# Patient Record
Sex: FEMALE | URBAN - METROPOLITAN AREA
[De-identification: names, ages, dates, MRNs, and addresses within clinical notes are randomized per-mention and may not be internally consistent; named-entity substitution may affect disease eponyms.]

---

## 2017-02-23 ENCOUNTER — APPOINTMENT (RX ONLY)
Dept: URBAN - METROPOLITAN AREA CLINIC 5 | Facility: CLINIC | Age: 42
Setting detail: DERMATOLOGY
End: 2017-02-23

## 2017-02-23 DIAGNOSIS — H61.11 ACQUIRED DEFORMITY OF PINNA: ICD-10-CM

## 2017-02-23 PROBLEM — H61.119 ACQUIRED DEFORMITY OF PINNA, UNSPECIFIED EAR: Status: ACTIVE | Noted: 2017-02-23

## 2017-02-23 PROCEDURE — ? RECOMMENDATIONS

## 2017-02-23 PROCEDURE — ? CONSULTATION - STRETCHED EARLOBE

## 2017-02-23 NOTE — PROCEDURE: RECOMMENDATIONS
Detail Level: Zone
Recommendation Preamble: The following recommendations were made during the visit:
Recommendations (Free Text): 1. Earlobe repair. Bilateral. Incomplete tear\\n\\n-may fernando in future after 6weeks with small studs. Large heavy earrings are not advised and will likely be painful until patient is months out of procedure.\\n-quoted $700 for procedure

## 2017-02-23 NOTE — HPI: EAR (TORN EARLOBE)
Is This A New Presentation, Or A Follow-Up?: Piercehole Tear
Which Ear(S) Are You Concerned About?: bilateral ears
Which Side(S)?: both ears
How Severe Is The Deformity?: moderate
How Many Widened Pierceholes?: 2